# Patient Record
Sex: FEMALE | Race: WHITE | NOT HISPANIC OR LATINO | URBAN - NONMETROPOLITAN AREA
[De-identification: names, ages, dates, MRNs, and addresses within clinical notes are randomized per-mention and may not be internally consistent; named-entity substitution may affect disease eponyms.]

---

## 2019-08-14 ENCOUNTER — IMPORTED ENCOUNTER (OUTPATIENT)
Dept: URBAN - NONMETROPOLITAN AREA CLINIC 1 | Facility: CLINIC | Age: 64
End: 2019-08-14

## 2019-08-14 PROCEDURE — 92015 DETERMINE REFRACTIVE STATE: CPT

## 2019-08-14 PROCEDURE — 99214 OFFICE O/P EST MOD 30 MIN: CPT

## 2019-08-14 PROCEDURE — 92133 CPTRZD OPH DX IMG PST SGM ON: CPT

## 2019-08-14 NOTE — PATIENT DISCUSSION
Glaucoma Suspect OUDiscussed diagnosis with patientOCT done today shows No NFL thinning OU PACH done in the past shows both corneas to be thickVF done previously OD No defects good field reliable and OS Non specific defects IOP normal today at 13 OUCup to Disc noted at 0.55 OUWihellen continue to monitorRTC 3 motnhs BL GL with VF  [place] OUDiscussed diagnosis with patientDiscussed signs and symptoms of progressionRecommend UV protectionNot visually significant at this timeWill continue to monitorFloaters OUDiscussed diagnosis with patientReviewed S/S of retinal detachment and tear with patient.  Advised to call or come in with any changes in [place] from todays visitWill continue to monitorDES OUDiscussed diagnosis in detail with patientDiscussed signs and symptoms of progressionRecommend patient drinking plenty of water and starting Omega 3’s Recommend Refresh or Systane  throughout the dayConsider Restasis or plugs in the future if no improvementContinue to monitorPresbyopia OUDiscussed refractive status with patientNew glasses Rx given todayAndrew continue to monitor; 's Notes: MR 8/14/19DFE 8/14/19OCT 8/14/19OptosVF 10/27/17PACH 11/18/16Gonio 5/23/13

## 2021-06-29 ENCOUNTER — IMPORTED ENCOUNTER (OUTPATIENT)
Dept: URBAN - NONMETROPOLITAN AREA CLINIC 1 | Facility: CLINIC | Age: 66
End: 2021-06-29

## 2021-06-29 PROBLEM — H43.393: Noted: 2021-06-29

## 2021-06-29 PROBLEM — H40.013: Noted: 2021-06-29

## 2021-06-29 PROBLEM — H25.813: Noted: 2021-06-29

## 2021-06-29 PROBLEM — H52.4: Noted: 2021-06-29

## 2021-06-29 PROBLEM — H04.123: Noted: 2021-06-29

## 2021-06-29 PROCEDURE — 99213 OFFICE O/P EST LOW 20 MIN: CPT

## 2021-06-29 PROCEDURE — 92015 DETERMINE REFRACTIVE STATE: CPT

## 2021-06-29 NOTE — PATIENT DISCUSSION
Glaucoma Suspect OU- Discussed diagnosis with patient- OCT done previously shows No NFL thinning OU - PACH done in the past shows both corneas to be thick- VF done previously OD No defects good field reliable and OS Non specific defects - IOP normal today at 15 OU- Cup to Disc noted at 0.55 OU- Continue to monitorCataracts OU- Discussed diagnosis with patient- Discussed signs and symptoms of progression- Recommend UV protection- Not visually significant at this time- Continue to monitorFloaters OU- Discussed diagnosis with patient- Reviewed S/S of retinal detachment and tear with patient.  Advised to call or come in with any changes in [place] from todays visit- Continue to monitorDES OU- Discussed diagnosis in detail with patient- Discussed signs and symptoms of progression- Recommend patient drinking plenty of water and starting Omega 3’s - Recommend Refresh or Systane  throughout the day- Consider Restasis or plugs in the future if no improvement- Continue to monitorPresbyopia OU- Discussed refractive status with patient- New glasses Rx given today- Continue to monitor; 's Notes: MR 8/14/19DFE 8/14/19OCT 8/14/19OptosVF 10/27/17PACH 11/18/16Gonio 5/23/13

## 2022-04-09 ASSESSMENT — VISUAL ACUITY
OU_CC: 20/25+
OS_SC: 20/30+
OS_SC: 20/20
OD_SC: 20/20-
OD_SC: 20/30+

## 2022-04-09 ASSESSMENT — TONOMETRY
OD_IOP_MMHG: 15
OD_IOP_MMHG: 13
OS_IOP_MMHG: 15
OS_IOP_MMHG: 13

## 2022-04-09 ASSESSMENT — PACHYMETRY
OS_CT_UM: 606; ADJ: THICK
OS_CT_UM: 606; ADJ: THICK
OD_CT_UM: 628; ADJ: THICK
OD_CT_UM: 628; ADJ: THICK

## 2022-10-04 ENCOUNTER — ESTABLISHED PATIENT (OUTPATIENT)
Dept: URBAN - NONMETROPOLITAN AREA CLINIC 1 | Facility: CLINIC | Age: 67
End: 2022-10-04

## 2022-10-04 DIAGNOSIS — H40.013: ICD-10-CM

## 2022-10-04 DIAGNOSIS — H11.31: ICD-10-CM

## 2022-10-04 DIAGNOSIS — H25.813: ICD-10-CM

## 2022-10-04 DIAGNOSIS — H52.4: ICD-10-CM

## 2022-10-04 PROCEDURE — 92015 DETERMINE REFRACTIVE STATE: CPT

## 2022-10-04 PROCEDURE — 99214 OFFICE O/P EST MOD 30 MIN: CPT

## 2022-10-04 ASSESSMENT — TONOMETRY
OS_IOP_MMHG: 15
OD_IOP_MMHG: 15

## 2022-10-04 ASSESSMENT — VISUAL ACUITY
OS_CC: 20/25
OD_CC: 20/25

## 2022-10-04 NOTE — PATIENT DISCUSSION
Discussed diagnosis in detail with patient. Recommend Refresh or Systane TID - QID. Recommend cool compresses through out the day. SAmples of Refresh Gel given today. Continue to monitor.

## 2022-10-04 NOTE — PATIENT DISCUSSION
Discussed diagnosis with patient. Discussed signs and symptoms of progression. Recommend UV protection. Not visually significant at this time. Continue to monitor.

## 2022-10-04 NOTE — PATIENT DISCUSSION
Discussed diagnosis in detail with patient. Discussed signs and symptoms of progression. Recommend patient drinking plenty of water and starting Omega 3’s. Recommend Refresh or Systane throughout the day. Consider Restasis or plugs in the future if no improvement. Continue to monitor.

## 2023-05-06 NOTE — PATIENT DISCUSSION
KASEY OU- Presbyopia OU- Discussed refractive status with patient- New glasses Rx given today- Continue to monitor; 's Notes: MR 8/14/19DFE 8/14/19OCT 8/14/19OptosVF 10/27/17PACH 11/18/16Gonio 5/23/13. Alert and oriented, no focal deficits, no motor or sensory deficits.

## 2024-05-06 ENCOUNTER — ESTABLISHED PATIENT (OUTPATIENT)
Dept: URBAN - NONMETROPOLITAN AREA CLINIC 1 | Facility: CLINIC | Age: 69
End: 2024-05-06

## 2024-05-06 DIAGNOSIS — H52.4: ICD-10-CM

## 2024-05-06 DIAGNOSIS — H11.32: ICD-10-CM

## 2024-05-06 DIAGNOSIS — H35.62: ICD-10-CM

## 2024-05-06 DIAGNOSIS — H40.013: ICD-10-CM

## 2024-05-06 DIAGNOSIS — H04.123: ICD-10-CM

## 2024-05-06 DIAGNOSIS — H25.813: ICD-10-CM

## 2024-05-06 PROCEDURE — 92015 DETERMINE REFRACTIVE STATE: CPT

## 2024-05-06 PROCEDURE — 99213 OFFICE O/P EST LOW 20 MIN: CPT

## 2024-05-06 PROCEDURE — 92250 FUNDUS PHOTOGRAPHY W/I&R: CPT

## 2024-05-06 ASSESSMENT — VISUAL ACUITY
OS_CC: 20/22-
OD_CC: 20/25-2
OU_CC: 20/22+

## 2024-05-06 ASSESSMENT — TONOMETRY
OD_IOP_MMHG: 14
OS_IOP_MMHG: 14